# Patient Record
Sex: FEMALE | Race: WHITE | Employment: OTHER | URBAN - NONMETROPOLITAN AREA
[De-identification: names, ages, dates, MRNs, and addresses within clinical notes are randomized per-mention and may not be internally consistent; named-entity substitution may affect disease eponyms.]

---

## 2022-07-21 ENCOUNTER — TELEPHONE (OUTPATIENT)
Dept: SURGERY | Age: 72
End: 2022-07-21

## 2022-07-21 NOTE — TELEPHONE ENCOUNTER
Referral from Dr Deejay Garcia office. Dr Zulema Blakely colonoscopy paperwork sent. Patient request Dr Micha shaikh @ [facility].

## 2022-08-10 RX ORDER — CALCIUM CARBONATE-CHOLECALCIFEROL TAB 250 MG-125 UNIT 250-125 MG-UNIT
1 TAB ORAL DAILY
COMMUNITY

## 2022-08-10 RX ORDER — CLOBETASOL PROPIONATE 0.5 MG/G
OINTMENT TOPICAL 2 TIMES DAILY
COMMUNITY

## 2022-08-10 RX ORDER — VENLAFAXINE HYDROCHLORIDE 150 MG/1
150 CAPSULE, EXTENDED RELEASE ORAL DAILY
COMMUNITY
Start: 2022-06-21

## 2022-08-10 RX ORDER — MAGNESIUM 200 MG
200 TABLET ORAL DAILY
COMMUNITY

## 2022-08-10 RX ORDER — DICLOFENAC SODIUM 25 MG/1
75 TABLET, DELAYED RELEASE ORAL 2 TIMES DAILY
COMMUNITY
Start: 2022-06-21

## 2022-08-10 RX ORDER — MULTIVIT WITH MINERALS/LUTEIN
1000 TABLET ORAL DAILY
COMMUNITY

## 2022-08-10 RX ORDER — ACETAMINOPHEN 160 MG
1 TABLET,DISINTEGRATING ORAL DAILY
COMMUNITY

## 2022-08-10 RX ORDER — M-VIT,TX,IRON,MINS/CALC/FOLIC 27MG-0.4MG
1 TABLET ORAL DAILY
COMMUNITY

## 2022-08-10 RX ORDER — OMEPRAZOLE 40 MG/1
40 CAPSULE, DELAYED RELEASE ORAL DAILY
COMMUNITY
Start: 2022-06-21

## 2022-08-10 RX ORDER — SIMVASTATIN 80 MG
80 TABLET ORAL NIGHTLY
COMMUNITY
Start: 2022-06-22

## 2022-08-24 ENCOUNTER — INITIAL CONSULT (OUTPATIENT)
Dept: SURGERY | Age: 72
End: 2022-08-24
Payer: MEDICARE

## 2022-08-24 VITALS
SYSTOLIC BLOOD PRESSURE: 124 MMHG | TEMPERATURE: 97 F | HEIGHT: 60 IN | DIASTOLIC BLOOD PRESSURE: 80 MMHG | BODY MASS INDEX: 25.36 KG/M2 | HEART RATE: 80 BPM | WEIGHT: 129.2 LBS

## 2022-08-24 DIAGNOSIS — R10.13 EPIGASTRIC PAIN: Primary | ICD-10-CM

## 2022-08-24 DIAGNOSIS — Z80.0 FAMILY HX OF COLON CANCER: ICD-10-CM

## 2022-08-24 PROCEDURE — 3017F COLORECTAL CA SCREEN DOC REV: CPT | Performed by: SURGERY

## 2022-08-24 PROCEDURE — G8417 CALC BMI ABV UP PARAM F/U: HCPCS | Performed by: SURGERY

## 2022-08-24 PROCEDURE — G8400 PT W/DXA NO RESULTS DOC: HCPCS | Performed by: SURGERY

## 2022-08-24 PROCEDURE — 1123F ACP DISCUSS/DSCN MKR DOCD: CPT | Performed by: SURGERY

## 2022-08-24 PROCEDURE — G8427 DOCREV CUR MEDS BY ELIG CLIN: HCPCS | Performed by: SURGERY

## 2022-08-24 PROCEDURE — 1090F PRES/ABSN URINE INCON ASSESS: CPT | Performed by: SURGERY

## 2022-08-24 PROCEDURE — 1036F TOBACCO NON-USER: CPT | Performed by: SURGERY

## 2022-08-24 PROCEDURE — 99214 OFFICE O/P EST MOD 30 MIN: CPT | Performed by: SURGERY

## 2022-08-24 NOTE — PROGRESS NOTES
Yaw Starr is a 66 y.o. female      CC:    Colon screening  Family hx colon cancer    HISTORY OF PRESENT ILLNESS:    Pt is 65 yo F that complains of constipation for last month. Maybe once a week. Like little pellets. Last CS 10 yrs ago [facility] in [facility]. 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 had diarrhea and she says it was treated/      Abd pain: yes, lower quadrant when constipation  Anemia: no  Bloating:yes, at times  Diarrhea: yes, was 8 month but better now  Constipation: no  Melena: no  Hematochezia:no  Rectal Bleedin months ago had a bloody mucus  Rectal/Anal Pain:no  Pruritus: no  Family history colon Cancer: yes, sister at age of 61  Previous colon cancer: no  Previous Colon Polyp: no  Change in bowels: no  Decrease caliber of stool: no  Change in color of stool: yes, diarrhea/constipation     Previous work up date: over 10 years in [address] [place] [address] in [facility], states had micro colilits      alos, complains of hx of gastric ulcer . Now if eats too much fresh vegs will get burning and pain in the epigastric. Is on prilosec 40 mg daily. Last EGD over 10 yr  ago.   Some heartburn,        Review of Systems:    General:  Fever: Negative  Weight Change:Negative  Night Sweats: Negative    Eye:  Blurry Vision:Negative  Double Vision: Negative    Ent:  Headaches: Negative  Sore throat: Negative    Allergy/Immunology:  Hives: Negative  Latex allergy: Negative    Hematology/Lymphatic:  Bleeding Problems: Negative  Blood Clots: Negative  Swollen Lymph Nodes: Negative    Lungs:  Cough: Negative  SOB: Negative  Wheezing:Negative    Cardiovascular:  Chest Pain: Negative  Palpitations:Negative    GI:   Decreased Appetite: Negative  Heartburn: Negative  Dysphagia: Negative  Nausea/Vomiting: Negative  Abdominal Pain: Negative  Change in Bowels:Negative  Constipation: Negative  Diarrhea: Negative  Rectal Bleeding: Negative    :   Dysuria: Negative  Increase Urinary Frequency/Urgency: Negative    Neuro:  Seizures: Negative  Confusion: Negative        PAST MEDICAL HISTORY:      Family History   Problem Relation Age of Onset    High Blood Pressure Father     Heart Disease Father     High Blood Pressure Sister      Social History     Socioeconomic History    Marital status:      Spouse name: Not on file    Number of children: Not on file    Years of education: Not on file    Highest education level: Not on file   Occupational History    Not on file   Tobacco Use    Smoking status: Never    Smokeless tobacco: Never   Vaping Use    Vaping Use: Never used   Substance and Sexual Activity    Alcohol use: Yes    Drug use: Not Currently    Sexual activity: Not on file   Other Topics Concern    Not on file   Social History Narrative    Not on file     Social Determinants of Health     Financial Resource Strain: Not on file   Food Insecurity: Not on file   Transportation Needs: Not on file   Physical Activity: Not on file   Stress: Not on file   Social Connections: Not on file   Intimate Partner Violence: Not on file   Housing Stability: Not on file     Past Surgical History:   Procedure Laterality Date    [facility]     Past Medical History:   Diagnosis Date    Anxiety     Colonoscopy causing post-procedural bleeding     GERD (gastroesophageal reflux disease)     Hyperlipidemia     Polyarthralgia      Current Outpatient Medications on File Prior to Visit   Medication Sig Dispense Refill    diclofenac (VOLTAREN) 25 MG EC tablet Take 75 mg by mouth 2 times daily      hydrocortisone 2.5 % ointment       omeprazole (PRILOSEC) 40 MG delayed release capsule Take 40 mg by mouth daily      simvastatin (ZOCOR) 80 MG tablet Take 80 mg by mouth nightly      venlafaxine (EFFEXOR XR) 150 MG extended release capsule Take 150 mg by mouth daily      Ascorbic Acid (VITAMIN C) 1000 MG tablet Take 1,000 mg by mouth in the morning. calcium carb-cholecalciferol 250-125 MG-UNIT TABS Take 1 tablet by mouth in the morning. Cholecalciferol (VITAMIN D3) 50 MCG (2000 UT) CAPS Take 1 capsule by mouth daily      clobetasol (TEMOVATE) 0.05 % ointment Apply topically 2 times daily Apply topically 2 times daily. Glucosamine-Chondroit-Collagen 250-200-116.67 MG CAPS Take by mouth      magnesium 200 MG TABS tablet Take 200 mg by mouth in the morning. Multiple Vitamins-Minerals (THERAPEUTIC MULTIVITAMIN-MINERALS) tablet Take 1 tablet by mouth in the morning. CHLOROPHYLL PO Take by mouth      Potassium 99 MG TABS Take 99 mg by mouth daily      B Complex Vitamins (VITAMIN B COMPLEX PO) Take 1 tablet by mouth daily       No current facility-administered medications on file prior to visit. Allergies as of 08/24/2022 - Fully Reviewed 08/24/2022   Allergen Reaction Noted    Iodopropynyl  06/24/2022    Premarin [conjugated estrogens]  08/10/2022    Propolis  06/24/2022    Sodium chromate  06/24/2022    Adhesive tape Rash 08/10/2022    Estrogens conjugated Rash 08/10/2022         PHYSICAL EXAM:    Blood pressure 124/80, pulse 80, temperature 97 °F (36.1 °C), temperature source Tympanic, height 5' (1.524 m), weight 129 lb 3.2 oz (58.6 kg). Gen:  A and O x 3, NAD, well nourished  Eyes:  Sclera non icterus, PERRL  Head:  Normocephalic, non-tender  Neck:  Supple, no adenopathy, thyroid non tender and no masses,no carotid bruits  Lungs:  CTA, symmetrical  Chest:  RRR, no murmurs  Abd:  Soft, NT, ND, no HSM, no hernias, no bruits  Ext:  No edema, no cyanosis  Psych: reveals appropriate mood, memory and judgment,  Neuro:  Reveals no gross motor or sensory deficits,   Msk:  5/5 strength all 4 extremities, no joint tenderness         ASSESS MENT:    Epigastric pain, long hx of PUD/GERD and treatment with PPI. Last EGD over 10 yrs ago  3.   + family hx of colon cancer, sister in her 63's  4.    Change in bowels with increase constipation      PLAN:     Set up for EGD/Cs

## 2022-08-24 NOTE — PROGRESS NOTES
General Surgery History & Physical  Patricia CandelarioANTONIO  Pt Name: Wisam Thompson  MRN: [id number]  [place]: 1950  Date of evaluation: 2022  Primary Care Physician: Thais Juarez DO    Patient evaluated at the request of Dr. Thais Juarez  Reason for evaluation: Constipation       History of Present Illness:           Abd pain: yes, lower quadrant when constipation  Anemia: no  Bloating:yes, at times  Diarrhea: yes, was 8 month but better now  Constipation: no  Melena: no  Hematochezia:no  Rectal Bleedin months ago had a bloody mucus  Rectal/Anal Pain:no  Pruritus: no  Family history colon Cancer: yes, sister at age of 61  Previous colon cancer: no  Previous Colon Polyp: no  Change in bowels: no  Decrease caliber of stool: no  Change in color of stool:     Previous work up date: over 10 years in [address] [place] [address], [facility] had micro colilits

## 2022-08-24 NOTE — PATIENT INSTRUCTIONS
Follow up pre op and bowel instruction for upper and lower scope that is scheduled for 9-16-22 in [facility]. Hold all vitamins for 7 days prior to procedure.

## 2022-09-15 ENCOUNTER — TELEPHONE (OUTPATIENT)
Dept: SURGERY | Age: 72
End: 2022-09-15

## 2022-09-15 NOTE — TELEPHONE ENCOUNTER
Called patient back and she would like to know the time of procedure. Transferred patient to Tera Holliday at [facility].

## 2022-09-15 NOTE — TELEPHONE ENCOUNTER
Patient called, is schedule for procedure with dr Jacquie Murillo tomorrow 9/16/22 at [place], was wondering what time.  Please call patient back at [phone]

## 2022-09-26 ENCOUNTER — TELEPHONE (OUTPATIENT)
Dept: SURGERY | Age: 72
End: 2022-09-26

## 2022-09-26 NOTE — TELEPHONE ENCOUNTER
Patient called the office wondering if the results of the colonoscopy and endo results are back yet.  Please advise and give her a call back at [phone]

## 2022-09-27 NOTE — TELEPHONE ENCOUNTER
Called patient back and EGD and Colonoscopy results were given. Patient verbalized understanding. Explained to patient to continue Prilosec 20 mg daily for 3 months. Patient states she understanding. Talked to patient with results from recent EGD and Colonoscopy at [facility] [place] with Dr. Mercedes Cummins on 9-16-22. Updated history, health maintenance, and recall. Forwarded results to PCP.